# Patient Record
Sex: MALE | Race: NATIVE HAWAIIAN OR OTHER PACIFIC ISLANDER | NOT HISPANIC OR LATINO | Employment: FULL TIME | URBAN - METROPOLITAN AREA
[De-identification: names, ages, dates, MRNs, and addresses within clinical notes are randomized per-mention and may not be internally consistent; named-entity substitution may affect disease eponyms.]

---

## 2024-02-06 ENCOUNTER — APPOINTMENT (OUTPATIENT)
Dept: GENERAL RADIOLOGY | Facility: CLINIC | Age: 39
End: 2024-02-06
Attending: EMERGENCY MEDICINE
Payer: OTHER MISCELLANEOUS

## 2024-02-06 VITALS
DIASTOLIC BLOOD PRESSURE: 86 MMHG | SYSTOLIC BLOOD PRESSURE: 136 MMHG | HEART RATE: 82 BPM | RESPIRATION RATE: 18 BRPM | TEMPERATURE: 98.9 F | OXYGEN SATURATION: 100 %

## 2024-02-06 PROCEDURE — 99283 EMERGENCY DEPT VISIT LOW MDM: CPT

## 2024-02-06 PROCEDURE — 71101 X-RAY EXAM UNILAT RIBS/CHEST: CPT | Mod: LT

## 2024-02-07 ENCOUNTER — HOSPITAL ENCOUNTER (EMERGENCY)
Facility: CLINIC | Age: 39
Discharge: HOME | End: 2024-02-07
Attending: EMERGENCY MEDICINE | Admitting: EMERGENCY MEDICINE
Payer: OTHER MISCELLANEOUS

## 2024-02-07 DIAGNOSIS — S20.212A CHEST WALL CONTUSION, LEFT, INITIAL ENCOUNTER: ICD-10-CM

## 2024-02-07 ASSESSMENT — ACTIVITIES OF DAILY LIVING (ADL): ADLS_ACUITY_SCORE: 33

## 2024-02-07 NOTE — ED TRIAGE NOTES
Fell at approximately 2200 on Monday.  Tripped on vaccuum cord at work. Fell onto left side onto concrete.  Denies hitting head, LOC, or blood thinners. Reports left sided rib cage pain. Denies shortness of breath.     Triage Assessment (Adult)       Row Name 02/06/24 5120          Triage Assessment    Airway WDL WDL        Respiratory WDL    Respiratory WDL WDL        Peripheral/Neurovascular WDL    Peripheral Neurovascular WDL WDL

## 2024-02-07 NOTE — ED PROVIDER NOTES
History     Chief Complaint:  Rib Pain and Fall    The history is provided by the patient.     Nicole Bustos is a 39 year old male with history below presenting for evaluation of rib pain. Nicole explains that he fell onto his left side last night and woke up this morning with increased pain in his left side. He notes use of ibuprofen for pain.    Independent Historian:   None - Patient Only    Review of External Notes:   None    Medications:    No current outpatient medications on file.      Past Medical History:    No past medical history on file.    Past Surgical History:    No past surgical history on file.     Physical Exam   Patient Vitals for the past 24 hrs:   BP Temp Temp src Pulse Resp SpO2   02/06/24 2326 136/86 98.9  F (37.2  C) Temporal 82 18 100 %     Physical Exam  Nursing note and vitals reviewed.  Constitutional: Cooperative.  Sitting up comfortably  Cardiovascular: Normal rate, regular rhythm and normal heart sounds.  No murmur.  Pulmonary/Chest: Effort normal and breath sounds normal. No respiratory distress. No wheezes. No rales. Tenderness to the left chest wall without crepitus.  Abdominal: Soft. Normal appearance. There is no tenderness.   Neurological: Alert.   Skin: Skin is warm and dry.   Psychiatric: Normal mood and affect.     Emergency Department Course   Imaging:  Ribs XR, unilat 3 views + PA chest,  left   Final Result   IMPRESSION: The visualized heart and lungs are negative. No rib fractures.        Assessments:  0107 I obtained history and examined the patient as noted above. I discussed findings and discharge with the patient. All questions answered.     Independent Interpretation (X-rays, CTs, rhythm strip):  I reviewed the patient's ribs X-ray and note no rib fractures or pneumothorax.    Consultations/Discussion of Management or Tests:  None      Social Determinants of Health affecting care:   None    Disposition:  The patient was discharged.     Impression & Plan    Medical  Decision Making:  Nicole Bustos is a 39 year old male who presents for evaluation after a injury to the chest wall.  The patient describes no associated shortness of breath and complains of no pleuritic pain.  The patient has good breath sounds in all fields and no considerable hypoxia or tachycardia.  There is no large hematoma or contusion.  No deformity.  No crepitance.  No fracture identified.  No pneumothorax.    The patient was given return precautions and follow up instructions, they state understanding of these and ability to comply. With reasonable clinical certainty, I believe the patient is safe for discharge and can be safely managed as an outpatient.    Diagnosis:    ICD-10-CM    1. Chest wall contusion, left, initial encounter  S20.212A         Scribe Disclosure:  I, Arash Vazquez, am serving as a scribe at 1:07 AM on 2/7/2024 to document services personally performed by Sidney Sanabria MD based on my observations and the provider's statements to me.   2/7/2024   Sidney Sanabria MD Amdahl, John, MD  02/07/24 0557

## 2024-02-07 NOTE — LETTER
February 7, 2024      To Whom It May Concern:      Nicole Bustos was seen in our Emergency Department today, 02/07/24.  I expect his condition to improve over the next 3 days.  He may return to work/school when improved.    Sincerely,        Sidney Sanabria MD